# Patient Record
Sex: MALE | Race: WHITE | NOT HISPANIC OR LATINO | URBAN - NONMETROPOLITAN AREA
[De-identification: names, ages, dates, MRNs, and addresses within clinical notes are randomized per-mention and may not be internally consistent; named-entity substitution may affect disease eponyms.]

---

## 2021-01-20 DIAGNOSIS — Z23 ENCOUNTER FOR IMMUNIZATION: ICD-10-CM

## 2021-02-03 ENCOUNTER — IMMUNIZATIONS (OUTPATIENT)
Dept: FAMILY MEDICINE CLINIC | Facility: HOSPITAL | Age: 76
End: 2021-02-03
Attending: INTERNAL MEDICINE

## 2021-02-03 DIAGNOSIS — Z23 ENCOUNTER FOR IMMUNIZATION: Primary | ICD-10-CM

## 2021-02-03 PROCEDURE — 91301 SARS-COV-2 / COVID-19 MRNA VACCINE (MODERNA) 100 MCG: CPT

## 2021-02-03 PROCEDURE — 0011A SARS-COV-2 / COVID-19 MRNA VACCINE (MODERNA) 100 MCG: CPT

## 2021-03-02 ENCOUNTER — IMMUNIZATIONS (OUTPATIENT)
Dept: FAMILY MEDICINE CLINIC | Facility: HOSPITAL | Age: 76
End: 2021-03-02

## 2021-03-02 DIAGNOSIS — Z23 ENCOUNTER FOR IMMUNIZATION: Primary | ICD-10-CM

## 2021-03-02 PROCEDURE — 0012A SARS-COV-2 / COVID-19 MRNA VACCINE (MODERNA) 100 MCG: CPT

## 2021-03-02 PROCEDURE — 91301 SARS-COV-2 / COVID-19 MRNA VACCINE (MODERNA) 100 MCG: CPT

## 2021-11-09 ENCOUNTER — IMMUNIZATIONS (OUTPATIENT)
Dept: FAMILY MEDICINE CLINIC | Facility: HOSPITAL | Age: 76
End: 2021-11-09

## 2021-11-09 DIAGNOSIS — Z23 ENCOUNTER FOR IMMUNIZATION: Primary | ICD-10-CM

## 2021-11-09 PROCEDURE — 91306 COVID-19 MODERNA VACC 0.25 ML BOOSTER: CPT

## 2021-11-09 PROCEDURE — 0013A COVID-19 MODERNA VACC 0.25 ML BOOSTER: CPT

## 2023-04-27 DIAGNOSIS — E78.5 HYPERLIPIDEMIA, UNSPECIFIED: ICD-10-CM

## 2023-04-27 DIAGNOSIS — E66.09 OTHER OBESITY DUE TO EXCESS CALORIES: ICD-10-CM

## 2023-04-27 DIAGNOSIS — R06.09 OTHER FORMS OF DYSPNEA: ICD-10-CM

## 2023-04-27 DIAGNOSIS — I10 ESSENTIAL (PRIMARY) HYPERTENSION: ICD-10-CM

## 2023-04-27 DIAGNOSIS — I49.3 VENTRICULAR PREMATURE DEPOLARIZATION: ICD-10-CM

## 2023-05-30 ENCOUNTER — HOSPITAL ENCOUNTER (OUTPATIENT)
Dept: NUCLEAR MEDICINE | Facility: HOSPITAL | Age: 78
Discharge: HOME | End: 2023-05-30
Attending: INTERNAL MEDICINE

## 2023-05-30 ENCOUNTER — HOSPITAL ENCOUNTER (OUTPATIENT)
Dept: NON INVASIVE DIAGNOSTICS | Facility: HOSPITAL | Age: 78
Discharge: HOME | End: 2023-05-30
Attending: INTERNAL MEDICINE

## 2023-05-30 VITALS
WEIGHT: 215 LBS | HEIGHT: 71 IN | SYSTOLIC BLOOD PRESSURE: 158 MMHG | BODY MASS INDEX: 30.1 KG/M2 | DIASTOLIC BLOOD PRESSURE: 82 MMHG | HEART RATE: 95 BPM

## 2023-05-30 DIAGNOSIS — I10 ESSENTIAL (PRIMARY) HYPERTENSION: ICD-10-CM

## 2023-05-30 DIAGNOSIS — R06.09 OTHER FORMS OF DYSPNEA: ICD-10-CM

## 2023-05-30 DIAGNOSIS — I49.3 VENTRICULAR PREMATURE DEPOLARIZATION: ICD-10-CM

## 2023-05-30 DIAGNOSIS — E78.5 HYPERLIPIDEMIA, UNSPECIFIED: ICD-10-CM

## 2023-05-30 DIAGNOSIS — E66.09 OTHER OBESITY DUE TO EXCESS CALORIES: ICD-10-CM

## 2023-05-30 LAB
AORTIC ROOT: 3.7 CM
ASCENDING AORTA: 4.1 CM
CHEST PAIN STATEMENT: NORMAL
E WAVE DECELERATION TIME: 245 MS
FRACTIONAL SHORTENING: 36 % (ref 28–44)
INTERVENTRICULAR SEPTUM IN DIASTOLE (PARASTERNAL SHORT AXIS VIEW): 1 CM
INTERVENTRICULAR SEPTUM: 1 CM (ref 0.6–1.1)
LAAS-AP2: 23.4 CM2
LAAS-AP4: 24.1 CM2
LEFT ATRIUM SIZE: 4.4 CM
LEFT INTERNAL DIMENSION IN SYSTOLE: 3 CM (ref 2.1–4)
LEFT VENTRICULAR INTERNAL DIMENSION IN DIASTOLE: 4.7 CM (ref 3.5–6)
LEFT VENTRICULAR POSTERIOR WALL IN END DIASTOLE: 1 CM
LEFT VENTRICULAR STROKE VOLUME: 66 ML
LVSV (TEICH): 66 ML
MAX DIASTOLIC BP: 95 MMHG
MAX HEART RATE: 94 BPM
MAX HR: 94 BPM
MAX PREDICTED HEART RATE: 143 BPM
MAX. SYSTOLIC BP: 175 MMHG
MV E'TISSUE VEL-LAT: 6 CM/S
MV E'TISSUE VEL-SEP: 7 CM/S
MV PEAK A VEL: 0.94 M/S
MV PEAK E VEL: 67 CM/S
MV STENOSIS PRESSURE HALF TIME: 71 MS
MV VALVE AREA P 1/2 METHOD: 3.1 CM2
NUC STRESS EJECTION FRACTION: 62 %
PROTOCOL NAME: NORMAL
RA PRESSURE ESTIMATED: 15 MMHG
RATE PRESSURE PRODUCT: NORMAL
REASON FOR TERMINATION: NORMAL
RIGHT ATRIUM AREA SYSTOLE A4C: 19.2 CM2
RIGHT VENTRICLE ID DIMENSION: 2.9 CM
SL CV LEFT ATRIUM LENGTH A2C: 5.2 CM
SL CV PED ECHO LEFT VENTRICLE DIASTOLIC VOLUME (MOD BIPLANE) 2D: 100 ML
SL CV PED ECHO LEFT VENTRICLE SYSTOLIC VOLUME (MOD BIPLANE) 2D: 35 ML
SL CV REST NUCLEAR ISOTOPE DOSE: 10.9 MCI
SL CV STRESS NUCLEAR ISOTOPE DOSE: 32.4 MCI
SL CV STRESS RECOVERY BP: NORMAL MMHG
SL CV STRESS RECOVERY HR: 87 BPM
SL CV STRESS RECOVERY O2 SAT: 99 %
STRESS ANGINA INDEX: 0
STRESS BASELINE BP: NORMAL MMHG
STRESS BASELINE HR: 71 BPM
STRESS O2 SAT REST: 98 %
STRESS PEAK HR: 94 BPM
STRESS POST EXERCISE DUR MIN: 3 MIN
STRESS POST EXERCISE DUR SEC: 0 SEC
STRESS POST O2 SAT PEAK: 99 %
STRESS POST PEAK BP: 163 MMHG
STRESS/REST PERFUSION RATIO: 1.17
TARGET HR FORMULA: NORMAL
TEST INDICATION: NORMAL
TIME IN EXERCISE PHASE: NORMAL
TRICUSPID ANNULAR PLANE SYSTOLIC EXCURSION: 2.4 CM
TRICUSPID VALVE PEAK E WAVE VELOCITY: 0.12 M/S

## 2023-05-30 RX ORDER — ATORVASTATIN CALCIUM 40 MG/1
TABLET, FILM COATED ORAL
COMMUNITY
Start: 2023-04-27

## 2023-05-30 RX ORDER — LISINOPRIL 20 MG/1
TABLET ORAL
COMMUNITY
Start: 2023-03-13

## 2023-05-30 RX ORDER — LEVOTHYROXINE SODIUM 0.05 MG/1
TABLET ORAL
COMMUNITY
Start: 2023-05-22

## 2023-05-30 RX ORDER — REGADENOSON 0.08 MG/ML
0.4 INJECTION, SOLUTION INTRAVENOUS ONCE
Status: COMPLETED | OUTPATIENT
Start: 2023-05-30 | End: 2023-05-30

## 2023-05-30 RX ORDER — ZOLPIDEM TARTRATE 10 MG/1
TABLET ORAL
COMMUNITY
Start: 2023-05-10

## 2023-05-30 RX ORDER — FUROSEMIDE 20 MG/1
TABLET ORAL
COMMUNITY
Start: 2023-05-22

## 2023-05-30 RX ADMIN — REGADENOSON 0.4 MG: 0.08 INJECTION, SOLUTION INTRAVENOUS at 10:15

## 2025-05-07 ENCOUNTER — EVALUATION (OUTPATIENT)
Dept: PHYSICAL THERAPY | Facility: CLINIC | Age: 80
End: 2025-05-07
Payer: COMMERCIAL

## 2025-05-07 VITALS — HEART RATE: 71 BPM | DIASTOLIC BLOOD PRESSURE: 64 MMHG | SYSTOLIC BLOOD PRESSURE: 131 MMHG

## 2025-05-07 DIAGNOSIS — I89.0 LYMPHEDEMA: ICD-10-CM

## 2025-05-07 DIAGNOSIS — M79.89 LEG SWELLING: Primary | ICD-10-CM

## 2025-05-07 PROCEDURE — 97140 MANUAL THERAPY 1/> REGIONS: CPT | Performed by: PHYSICAL THERAPIST

## 2025-05-07 PROCEDURE — 97162 PT EVAL MOD COMPLEX 30 MIN: CPT | Performed by: PHYSICAL THERAPIST

## 2025-05-07 PROCEDURE — 97110 THERAPEUTIC EXERCISES: CPT | Performed by: PHYSICAL THERAPIST

## 2025-05-07 NOTE — PROGRESS NOTES
"PT Evaluation     Today's date: 2025  Patient name: Bartolo Orellana  : 1945  MRN: [id number]  Referring provider: Jero Obrien MD  Dx:   Encounter Diagnosis     ICD-10-CM    1. Leg swelling  M79.89       2. Lymphedema  I89.0           Start Time: 0845  Stop Time: 0945  Total time in clinic (min): 60 minutes  Lymphedema Physical Assessment    SUBJECTIVE EVALUATION:  History of present illness: swelling in Les x   years  tx with diuretic as per pt which he states did not change swelling. Notes gradual increase in swelling , stiffness in distal LE R > L . Tried elevation which did not help and had increase in dose of diuretic which he notes did not change swelling. Referred to cardiology and with review of chart , noted \"LE edema - still suspect lymphedema and not HF\" as per Dr. Hill. Pt using medication and compression stockings but notes no help with these changes and now referred to tx for LE swelling which he feels is worsening.  Pt notes some hx of swelling in Les of his brother  and niece but no know dx of lymphedema. 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  Exercises in swim spa consistently. Pt notes swelling is not gone in am in either LE.   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    OBJECTIVE:    Pulse: (0 no, 1+ diminished, 2+ mildly diminished, 3+ normal, 4 bounding)    Dorsal Pedal: 2+ L, unable R secondary to swelling  Posterior Tibial:2+    Capillary Refill (=< 3 seconds is normal): 2 seconds B    Palpation: dec skin mobility, pain with palpation of shin, ankles and dorsal feet where swelling present    Pittin+ distal 2/3 rds and dorsal foot RLE, 1-2+ L distal 1/4 and dorsal foot L LE    Stemmer's Sign: positive B    Gait " assessment:Independent     Transfer status: independent    Ability to don/doff clothing/garments:  independent    LE ROM:   Knee flexion limitation: wfls B   Ankle DF/PF limitation: R 5-30 , L 8 - 40  reports tightness and stiffness each LE with end ranges arthur ant ankle  and foot with end ranges of pf    Strength:    LE: WFLs at hips, knees and ankles    Self management:  Home compression pump:none  Compression stockings:none medical from [employer], did not wear today, states do not help  Elevation : completed some not above heart consistently, does not sleep in dependent position  Lymphedema exercise:none    Skin Assessment:  Skin mobility: decreased through distal 2/3 rds and dorsal foot RLE,  L distal 1/4 and dorsal foot L LE  Fibrosis (0 normal - 4 >severe): 1 right lower leg and dorsal foot  Erythema scale (1 very faint, 2 faint, 3 bright, 4 very bright):none either LE  Hemosiderin staining present:very minimal R shin, varicosities visible LLE lower leg, some present on R   Blister present: none   Location:   Color:   Size:  Wound present:none   Location:   Color:   Size:  NAILS - fair   Visualization:  Bony prominences: yes L ankle, not foot, none R ankle or foot  Tendon pathways:none either   Joint creases: none    Lymphedema classification (0 latent, 1 reverse, 2 non-rev, 3 elephantiasis): 2 R, 1 L     PMHx:  Cellulitis:none   Most recent:  Cardiac:HTN  Renal:na  Hepatic:na  Cancer:na   Type:   Treatment:  Diabetes:na  Ortho:na  Surgical:na       LLIS 24% impairment today    >> REFER TO FLOW SHEET FOR GIRTH MEASUREMENTS <<  LOWER EXTREMITY LEFT CALCULATIONS      Flowsheet Row Most Recent Value   Volume LE (mL) 4810   Difference from last visit (mL)  -4810   Difference from first visit (mL)  -4810          LOWER EXTREMITY RIGHT CALCULATIONS      Flowsheet Row Most Recent Value   Volume LE (mL) 5217   Difference from last visit (mL)  -5217   Difference from first visit (mL)  -5217          LOWER EXTREMITY CHANGE  OF AFFECTED LIMB CALCULATIONS      Flowsheet Row Most Recent Value   Current change in volume of affected limb (mL)  0   Current change in volume of affected limb (%)  8   Change in volume of affected limb since eval (mL)  0   Change in volume of affected limb since eval (%)  0              ASSESSMENT:  Assessment details:  Pt presents w/ B LE edema w/ + stemmer's, mild pitting, and slight skin fibrosis, predominant involvement of B lower legs distal 2/3rds into feet, not toes.  R>L girth measurements are noted which fits visual inspection of R>L LE size. PMHx not significant for DVT, trauma  and cardiologist identified lymphedema prior. Advise initiating full CDT protocol w/ MLD and compression bandage alternative wraps R and stocking 20-30 mmHG LLE. Fitment of compression stockings RLE once limb size is reduced.     PT reviewed with patient lymphedema education of skin care including: elevation 3 x per day 30-60 minutes higher than heart, keeping extremity clean and dry, applying moisturizer daily, special attention to nail care, wearing sunscreen avoiding nicks and skin irritation from razors, wearing gloves with activities that may cause skin injury.   Also reviewed for patient to avoid excessive constriction such as tight clothing and jewelry, extreme temperature changes, and the importance of compliance for bandaging and garments.   Patient also advised to contact physician if experiencing any constitutional symptoms, swelling, redness, pain, rash, itching, or increased skin temperature.      Goals:STG  - Patient will understand lymphedema precautions to decrease risk of infection and exacerbation of lymphedema  - Patient will develop a tolerance for wearing bandage alternative wrap R , stocking L betweens sessions to facilitate limb decongestion  - Patient will experience decrease in pitting edema which will improve tissue health and decrease risk of infection.   - Patient will perform HEP independently or  with minimal assistance to help improve lymphatic flow and venous return    LTG  - Patient will experience increased ROM and functional mobility to aid with ADL's at time of discharge  - Patient and/or caregiver will be independent with donning and doffing of compression garments which will enable regular daily garment wear at time of discharge, skin maintenance, and self- MLD   - Patient and/or caregiver will be independent with HEP and lymphedema management to prevent relapse and reduce risk of infection and hospitalizations at time of discharge    LTG:    Lymphedema Life Impact Scale    % Impairment: 24%  Infection Occurrence: none         PLAN:  Patient would benefit from: skilled physical therapy  Planned therapy interventions: manual therapy, massage, compression, home exercise program and patient education  Frequency: 1-2 x oer week  Duration in visits: up to 24  Duration in weeks: 12  Plan of Care beginning date: 5/7/25  Plan of Care expiration date: 7/16/25  Treatment plan discussed with: patient      Precautions: HTN     Daily Treatment Diary:      Initial Evaluation Date: 5/7/2025  Compliance 5/7                     Visit Number 1                    Re-Eval  IE                 MC   LLIS % impairment 24%                           5/7                     Manual                      Circumf measures pc                     mld                                            Ther-Ex                      AROM ex fr Ly return pc                     D . Breathing pc                                                                                                                                                                               HEP, pt educ tx plan, elevation 3 x per day higher than heart 30-60 min, skin/nail care, signs or sx to contact physician or ED if occur PC                     Neuro Re-Ed                                                                                                Ther-Act                                                                Modalities

## 2025-05-07 NOTE — LETTER
"May 9, 2025    No Recipients    Patient: Bartolo Orellana   YOB: 1945   Date of Visit: 2025     Encounter Diagnosis     ICD-10-CM    1. Leg swelling  M79.89       2. Lymphedema  I89.0           Dear Dr. Jero Obrien MD:    Thank you for your recent referral of Bartolo Orellana. Please review the attached evaluation summary from Bartolo's recent visit.     Please verify that you agree with the plan of care by signing the attached order.     If you have any questions or concerns, please do not hesitate to call.     I sincerely appreciate the opportunity to share in the care of one of your patients and hope to have another opportunity to work with you in the near future.       Sincerely,    Sherry Mckeon, PT      Referring Provider:      I certify that I have read the below Plan of Care and certify the need for these services furnished under this plan of treatment while under my care.                    Jero Obrien MD  [address] [place] [address]  Via Fax: [phone]          PT Evaluation     Today's date: 2025  Patient name: Bartolo Orellana  : 1945  MRN: [id number]  Referring provider: Jero Obrien MD  Dx:   Encounter Diagnosis     ICD-10-CM    1. Leg swelling  M79.89       2. Lymphedema  I89.0           Start Time: 0845  Stop Time: 0945  Total time in clinic (min): 60 minutes  Lymphedema Physical Assessment    SUBJECTIVE EVALUATION:  History of present illness: swelling in Les x   years  tx with diuretic as per pt which he states did not change swelling. Notes gradual increase in swelling , stiffness in distal LE R > L . Tried elevation which did not help and had increase in dose of diuretic which he notes did not change swelling. Referred to cardiology and with review of chart , noted \"LE edema - still suspect lymphedema and not HF\" as per Dr. Hill. Pt using medication and compression stockings but notes no help with these changes and now " referred to tx for LE swelling which he feels is worsening.  Pt notes some hx of swelling in Les of his brother  and niece but no know dx of lymphedema. 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  Exercises in swim spa consistently. Pt notes swelling is not gone in am in either LE.   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    OBJECTIVE:    Pulse: (0 no, 1+ diminished, 2+ mildly diminished, 3+ normal, 4 bounding)    Dorsal Pedal: 2+ L, unable R secondary to swelling  Posterior Tibial:2+    Capillary Refill (=< 3 seconds is normal): 2 seconds B    Palpation: dec skin mobility, pain with palpation of shin, ankles and dorsal feet where swelling present    Pittin+ distal 2/3 rds and dorsal foot RLE, 1-2+ L distal 1/4 and dorsal foot L LE    Stemmer's Sign: positive B    Gait assessment:Independent     Transfer status: independent    Ability to don/doff clothing/garments:  independent    LE ROM:   Knee flexion limitation: wfls B   Ankle DF/PF limitation: R 5-30 , L 8 - 40  reports tightness and stiffness each LE with end ranges arthur ant ankle  and foot with end ranges of pf    Strength:    LE: WFLs at hips, knees and ankles    Self management:  Home compression pump:none  Compression stockings:none medical from [employer], did not wear today, states do not help  Elevation : completed some not above heart consistently, does not sleep in dependent position  Lymphedema exercise:none    Skin Assessment:  Skin mobility: decreased through distal 2/3 rds and dorsal foot RLE,  L distal 1/4 and dorsal foot L LE  Fibrosis (0 normal - 4 >severe): 1 right lower leg and dorsal foot  Erythema scale (1 very faint, 2 faint, 3 bright, 4 very bright):none either LE  Hemosiderin staining  present:very minimal R shin, varicosities visible LLE lower leg, some present on R   Blister present: none   Location:   Color:   Size:  Wound present:none   Location:   Color:   Size:  NAILS - fair   Visualization:  Bony prominences: yes L ankle, not foot, none R ankle or foot  Tendon pathways:none either   Joint creases: none    Lymphedema classification (0 latent, 1 reverse, 2 non-rev, 3 elephantiasis): 2 R, 1 L     PMHx:  Cellulitis:none   Most recent:  Cardiac:HTN  Renal:na  Hepatic:na  Cancer:na   Type:   Treatment:  Diabetes:na  Ortho:na  Surgical:na       LLIS 24% impairment today    >> REFER TO FLOW SHEET FOR GIRTH MEASUREMENTS <<  LOWER EXTREMITY LEFT CALCULATIONS      Flowsheet Row Most Recent Value   Volume LE (mL) 4810   Difference from last visit (mL)  -4810   Difference from first visit (mL)  -4810          LOWER EXTREMITY RIGHT CALCULATIONS      Flowsheet Row Most Recent Value   Volume LE (mL) 5217   Difference from last visit (mL)  -5217   Difference from first visit (mL)  -5217          LOWER EXTREMITY CHANGE OF AFFECTED LIMB CALCULATIONS      Flowsheet Row Most Recent Value   Current change in volume of affected limb (mL)  0   Current change in volume of affected limb (%)  8   Change in volume of affected limb since eval (mL)  0   Change in volume of affected limb since eval (%)  0              ASSESSMENT:  Assessment details:  Pt presents w/ B LE edema w/ + stemmer's, mild pitting, and slight skin fibrosis, predominant involvement of B lower legs distal 2/3rds into feet, not toes.  R>L girth measurements are noted which fits visual inspection of R>L LE size. PMHx not significant for DVT, trauma  and cardiologist identified lymphedema prior. Advise initiating full CDT protocol w/ MLD and compression bandage alternative wraps R and stocking 20-30 mmHG LLE. Fitment of compression stockings RLE once limb size is reduced.     PT reviewed with patient lymphedema education of skin care including:  elevation 3 x per day 30-60 minutes higher than heart, keeping extremity clean and dry, applying moisturizer daily, special attention to nail care, wearing sunscreen avoiding nicks and skin irritation from razors, wearing gloves with activities that may cause skin injury.   Also reviewed for patient to avoid excessive constriction such as tight clothing and jewelry, extreme temperature changes, and the importance of compliance for bandaging and garments.   Patient also advised to contact physician if experiencing any constitutional symptoms, swelling, redness, pain, rash, itching, or increased skin temperature.      Goals:STG  - Patient will understand lymphedema precautions to decrease risk of infection and exacerbation of lymphedema  - Patient will develop a tolerance for wearing bandage alternative wrap R , stocking L betweens sessions to facilitate limb decongestion  - Patient will experience decrease in pitting edema which will improve tissue health and decrease risk of infection.   - Patient will perform HEP independently or with minimal assistance to help improve lymphatic flow and venous return    LTG  - Patient will experience increased ROM and functional mobility to aid with ADL's at time of discharge  - Patient and/or caregiver will be independent with donning and doffing of compression garments which will enable regular daily garment wear at time of discharge, skin maintenance, and self- MLD   - Patient and/or caregiver will be independent with HEP and lymphedema management to prevent relapse and reduce risk of infection and hospitalizations at time of discharge    LTG:    Lymphedema Life Impact Scale    % Impairment: 24%  Infection Occurrence: none         PLAN:  Patient would benefit from: skilled physical therapy  Planned therapy interventions: manual therapy, massage, compression, home exercise program and patient education  Frequency: 1-2 x oer week  Duration in visits: up to 24  Duration in weeks:  12  Plan of Care beginning date: 5/7/25  Plan of Care expiration date: 7/16/25  Treatment plan discussed with: patient      Precautions: HTN     Daily Treatment Diary:      Initial Evaluation Date: 5/7/2025  Compliance 5/7                     Visit Number 1                    Re-Eval  IE                 MC   LLIS % impairment 24%                           5/7                     Manual                      Circumf measures pc                     mld                                            Ther-Ex                      AROM ex fr Ly return pc                     D . Breathing pc                                                                                                                                                                               HEP, pt educ tx plan, elevation 3 x per day higher than heart 30-60 min, skin/nail care, signs or sx to contact physician or ED if occur PC                     Neuro Re-Ed                                                                                                Ther-Act                                                               Modalities

## 2025-05-08 NOTE — HOME EXERCISE EDUCATION
Program_ID:[id number]   Access Code: [email or web address]  URL: [email or web address]  Date: 05-  Prepared By: Sherry Mckeon    Program Notes      Exercises      - Reclined Diaphragmatic Breathing - 2 x daily - 7 x weekly - 1 sets - 20 reps      - Seated Toe Curl - 2 x daily - 7 x weekly - 1 sets - 20 reps      - Supine Ankle Dorsiflexion and Plantarflexion AROM - 2 x daily - 7 x weekly - 1 sets - 20 reps      - Supine Ankle Circles - 2 x daily - 7 x weekly - 1 sets - 20 reps      - Supine Heel Slide - 2 x daily - 7 x weekly - 1 sets - 20 reps      - Supine Quad Set - 2 x daily - 7 x weekly - 1 sets - 20 reps - 3 hold      - Small Range Straight Leg Raise - 2 x daily - 7 x weekly - 1 sets - 20 reps - 2 hold      - Bent Knee Fallouts - 2 x daily - 7 x weekly - 1 sets - 20 reps - 5 hold      - Supine Gluteal Sets - 2 x daily - 7 x weekly - 1 sets - 20 reps - 2 hold      - Lower Trunk Rotations - 2 x daily - 7 x weekly - 1 sets - 20 reps - 5 hold    Patient Education      - Leg Elevation for Swelling

## 2025-05-21 ENCOUNTER — OFFICE VISIT (OUTPATIENT)
Dept: PHYSICAL THERAPY | Facility: CLINIC | Age: 80
End: 2025-05-21
Attending: FAMILY MEDICINE
Payer: COMMERCIAL

## 2025-05-21 DIAGNOSIS — I89.0 LYMPHEDEMA: ICD-10-CM

## 2025-05-21 DIAGNOSIS — M79.89 LEG SWELLING: Primary | ICD-10-CM

## 2025-05-21 PROCEDURE — 97140 MANUAL THERAPY 1/> REGIONS: CPT | Performed by: PHYSICAL THERAPIST

## 2025-05-21 NOTE — PROGRESS NOTES
Daily Note     Today's date: 2025  Patient name: Bartolo Orellana  : 1945  MRN: [id number]  Referring provider: Jero Obrien MD  Dx:   Encounter Diagnosis     ICD-10-CM    1. Leg swelling  M79.89       2. Lymphedema  I89.0                      Subjective: Pt notes he is not seeing any improvements with  compliance with elevation, HEP since IE.       Objective: See treatment diary below. Initiated proximal MLD tolerated well by pt during and after. Presents with pitted swelling R dorsal foot, ankle and lower leg.       Assessment: Tolerated treatment well. Will likely benefit from ready wrap foot and calf for RLE, compression stocking for LLE and I will reach out to Dr. Obrien requesting  order. Pt approved submission to [facility] for insurance check. Patient would benefit from continued PT      Plan: Continue per plan of care.      Precautions: HTN     Daily Treatment Diary:      Initial Evaluation Date: 2025  Compliance                    Visit Number 1 2                   Re-Eval  IE                 MC   LLIS % impairment 24%                                              Manual                      Circumf measures pc  garment measures pc                   mld   prox abd, RLE pc                                         Ther-Ex                      AROM ex fr Ly return pc                     D . Breathing pc                                                                                                                                                                               HEP, pt educ tx plan, elevation 3 x per day higher than heart 30-60 min, skin/nail care, signs or sx to contact physician or ED if occur PC  pc review                   Neuro Re-Ed                                                                                                Ther-Act                                                               Modalities

## 2025-05-28 ENCOUNTER — OFFICE VISIT (OUTPATIENT)
Dept: PHYSICAL THERAPY | Facility: CLINIC | Age: 80
End: 2025-05-28
Attending: FAMILY MEDICINE
Payer: COMMERCIAL

## 2025-05-28 DIAGNOSIS — M79.89 LEG SWELLING: Primary | ICD-10-CM

## 2025-05-28 DIAGNOSIS — I89.0 LYMPHEDEMA: ICD-10-CM

## 2025-05-28 PROCEDURE — 97140 MANUAL THERAPY 1/> REGIONS: CPT | Performed by: PHYSICAL THERAPIST

## 2025-05-28 NOTE — PROGRESS NOTES
Daily Note     Today's date: 2025  Patient name: Bartolo Orellana  : 1945  MRN: [id number]  Referring provider: Jero Obrien MD  Dx:   Encounter Diagnosis     ICD-10-CM    1. Leg swelling  M79.89       2. Lymphedema  I89.0                      Subjective: Pt notes wearing compression socks with zippers most day with some improvements. He notes compliance with HEP and elevation.  He notes tenderness to touch and stiffness with mobility.       Objective: See treatment diary below. Poor fitment of compression stockings through calves educated pt in proper garment features and benefits. Called to pcp to request order for ready wrap for RLE and foot and compression stocking for LLE this date.       Assessment: Tolerated treatment well. Patient would benefit from continued PT and compression garments medical grade to assist with stage II Lymphedema RLE especially. Anticipate ready wrap to further reduce swelling in RLE/foot and L to do well in compression stocking.       Plan: Continue per plan of care.      Precautions: HTN     Daily Treatment Diary:      Initial Evaluation Date: 2025  Compliance                  Visit Number 1 2  3                 Re-Eval  IE                 MC   LLIS % impairment 24%                                            Manual                      Circumf measures pc  garment measures pc  remeasure as reduction                 mld   prox abd, RLE pc  45 m                                       Ther-Ex                      AROM ex fr Ly return pc                     D . Breathing pc                                                                                                                                                                               HEP, pt educ tx plan, elevation 3 x per day higher than heart 30-60 min, skin/nail care, signs or sx to contact physician or ED if occur PC  pc review                   Neuro Re-Ed                                                                                                 Ther-Act                                                               Modalities

## 2025-05-30 ENCOUNTER — OFFICE VISIT (OUTPATIENT)
Dept: PHYSICAL THERAPY | Facility: CLINIC | Age: 80
End: 2025-05-30
Attending: FAMILY MEDICINE
Payer: COMMERCIAL

## 2025-05-30 DIAGNOSIS — M79.89 LEG SWELLING: Primary | ICD-10-CM

## 2025-05-30 DIAGNOSIS — I89.0 LYMPHEDEMA: ICD-10-CM

## 2025-05-30 PROCEDURE — 97140 MANUAL THERAPY 1/> REGIONS: CPT | Performed by: PHYSICAL THERAPIST

## 2025-05-30 NOTE — PROGRESS NOTES
Daily Note     Today's date: 2025  Patient name: Bartolo Orellana  : 1945  MRN: [id number]  Referring provider: Jero Obrien MD  Dx:   Encounter Diagnosis     ICD-10-CM    1. Leg swelling  M79.89       2. Lymphedema  I89.0                      Subjective:  Pt notes no changes in swelling  despite elevation, HEP and use of current compression stockings.       Objective: See treatment diary below. Pt presents with pitting swelling dorsal right foot.       Assessment: Tolerated treatment well. Patient would benefit from continued PT.       Plan: Continue per plan of care.      Precautions: HTN     Daily Treatment Diary:      Initial Evaluation Date: 2025  Compliance                Visit Number 1 2  3  4               Re-Eval  IE                 MC   LLIS % impairment 24%                                          Manual                      Circumf measures pc  garment measures pc  remeasure as reduction                 mld   prox abd, RLE pc  45 m  45m                                     Ther-Ex                      AROM ex fr Ly return pc                     D . Breathing pc                                                                                                                                                                               HEP, pt educ tx plan, elevation 3 x per day higher than heart 30-60 min, skin/nail care, signs or sx to contact physician or ED if occur PC  pc review                   Neuro Re-Ed                                                                                                Ther-Act                                                               Modalities

## 2025-06-04 ENCOUNTER — OFFICE VISIT (OUTPATIENT)
Dept: PHYSICAL THERAPY | Facility: CLINIC | Age: 80
End: 2025-06-04
Attending: FAMILY MEDICINE
Payer: COMMERCIAL

## 2025-06-04 DIAGNOSIS — I89.0 LYMPHEDEMA: ICD-10-CM

## 2025-06-04 DIAGNOSIS — M79.89 LEG SWELLING: Primary | ICD-10-CM

## 2025-06-04 PROCEDURE — 97140 MANUAL THERAPY 1/> REGIONS: CPT | Performed by: PHYSICAL THERAPIST

## 2025-06-04 NOTE — PROGRESS NOTES
Daily Note     Today's date: 2025  Patient name: Bartolo Orellana  : 1945  MRN: [id number]  Referring provider: Jero Obrien MD  Dx:   Encounter Diagnosis     ICD-10-CM    1. Leg swelling  M79.89       2. Lymphedema  I89.0                      Subjective: Pt  noting swelling about the same with compliance with HEP, elevation 3 x per day x 30 min and use of current compression socks.       Objective: See treatment diary below. Slight reduction visible in R dorsal foot and ankle but pitting persists B 2+ R, 1+ L dorsal foot. No swelling in toes.       Assessment: Tolerated treatment well. Patient would benefit from continued PT, compression stockings and lymphedema sequential pumps to assist with reduction and management of stage 2 lymphedema. Pt is not finding significant improvements in sx of tightness, heaviness and difficulty with garments currently.       Plan: Continue per plan of care.      Precautions: HTN     Daily Treatment Diary:      Initial Evaluation Date: 2025  Compliance   reeval           Visit Number 1 2  3  4  5             Re-Eval  IE                    LLIS % impairment 24%                                        Manual                      Circumf measures pc  garment measures pc  remeasure as reduction    brief remeasure garments             mld   prox abd, RLE pc  45 m  45m  42m                                   Ther-Ex                      AROM ex fr Ly return pc                     D . Breathing pc                                                                                                                                                                               HEP, pt educ tx plan, elevation 3 x per day higher than heart 30-60 min, skin/nail care, signs or sx to contact physician or ED if occur PC  pc review                   Neuro Re-Ed                                                                                                 Ther-Act                                                               Modalities

## 2025-06-06 ENCOUNTER — OFFICE VISIT (OUTPATIENT)
Dept: PHYSICAL THERAPY | Facility: CLINIC | Age: 80
End: 2025-06-06
Attending: FAMILY MEDICINE
Payer: COMMERCIAL

## 2025-06-06 DIAGNOSIS — I89.0 LYMPHEDEMA: ICD-10-CM

## 2025-06-06 DIAGNOSIS — M79.89 LEG SWELLING: Primary | ICD-10-CM

## 2025-06-06 PROCEDURE — 97140 MANUAL THERAPY 1/> REGIONS: CPT | Performed by: PHYSICAL THERAPIST

## 2025-06-06 NOTE — PROGRESS NOTES
Daily Note     Today's date: 2025  Patient name: Bartolo Orellana  : 1945  MRN: [id number]  Referring provider: Jero Obrien MD  Dx:   Encounter Diagnosis     ICD-10-CM    1. Leg swelling  M79.89       2. Lymphedema  I89.0                      Subjective: Pt notes legs are the same with completion of elevation  3 x per day, HEP and use of his current stockings.       Objective: See treatment diary below. Pt demonstrates pitted swelling dorsal R greater than L foot, non pitted swelling in ankles and lower legs, greater on R than L. No open areas, no rashes, no blisters or wounds present either LE or foot.      Assessment: Tolerated treatment well. Patient would benefit from continued PT and  ordered compression garments.      Plan: Continue per plan of care.      Precautions: HTN     Daily Treatment Diary:      Initial Evaluation Date: 2025  Compliance            Visit Number 1 2  3  4  5  6           Re-Eval  IE                 MC   LLIS % impairment 24%                                      Manual                      Circumf measures pc  garment measures pc  remeasure as reduction    brief remeasure garments             mld   prox abd, RLE pc  45 m  45m  42m  45m                                 Ther-Ex                      AROM ex fr Ly return pc                     D . Breathing pc                                                                                                                                                                               HEP, pt educ tx plan, elevation 3 x per day higher than heart 30-60 min, skin/nail care, signs or sx to contact physician or ED if occur PC  pc review                   Neuro Re-Ed                                                                                                Ther-Act                                                               Modalities

## 2025-06-12 ENCOUNTER — APPOINTMENT (OUTPATIENT)
Dept: PHYSICAL THERAPY | Facility: CLINIC | Age: 80
End: 2025-06-12
Attending: FAMILY MEDICINE
Payer: COMMERCIAL

## 2025-06-18 ENCOUNTER — EVALUATION (OUTPATIENT)
Dept: PHYSICAL THERAPY | Facility: CLINIC | Age: 80
End: 2025-06-18
Attending: FAMILY MEDICINE
Payer: COMMERCIAL

## 2025-06-18 DIAGNOSIS — I89.0 LYMPHEDEMA: ICD-10-CM

## 2025-06-18 DIAGNOSIS — M79.89 LEG SWELLING: Primary | ICD-10-CM

## 2025-06-18 PROCEDURE — 97140 MANUAL THERAPY 1/> REGIONS: CPT | Performed by: PHYSICAL THERAPIST

## 2025-06-18 NOTE — LETTER
"2025    Jero Obrien MD  [address] [facility] [address]    Patient: Bartolo Orellana   YOB: 1945   Date of Visit: 2025     Encounter Diagnosis     ICD-10-CM    1. Leg swelling  M79.89       2. Lymphedema  I89.0           Dear Dr. Jero Obrien MD:    Thank you for your recent referral of Bartolo Orellana. Please review the attached evaluation summary from Bartolo's recent visit.     Please verify that you agree with the plan of care by signing the attached order.     If you have any questions or concerns, please do not hesitate to call.     I sincerely appreciate the opportunity to share in the care of one of your patients and hope to have another opportunity to work with you in the near future.       Sincerely,    Sherry Mckeon, PT      Referring Provider:      I certify that I have read the below Plan of Care and certify the need for these services furnished under this plan of treatment while under my care.                    Jero Obrien MD  [address] [facility] [address]  Via Fax: [phone]          PT Re-Evaluation     Today's date: 2025  Patient name: Bartolo Orellana  : 1945  MRN: [id number]  Referring provider: Jero Obrien MD  Dx:   Encounter Diagnosis     ICD-10-CM    1. Leg swelling  M79.89       2. Lymphedema  I89.0             Start Time: 0800  Stop Time: 0845  Total time in clinic (min): 45 minutes  Lymphedema Physical Assessment    SUBJECTIVE EVALUATION:  History of present illness: swelling in Les x   years  tx with diuretic as per pt which he states did not change swelling. Notes gradual increase in swelling , stiffness in distal LE R > L . Tried elevation which did not help and had increase in dose of diuretic which he notes did not change swelling. Referred to cardiology and with review of chart , noted \"LE edema - still suspect lymphedema and not HF\" as per Dr. Hill. Pt using medication and " compression stockings but notes no help with these changes and now referred to tx for LE swelling which he feels is worsening.  Pt notes some hx of swelling in Les of his brother  and niece but no know dx of lymphedema. 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  Exercises in swim spa consistently. Pt notes swelling is not gone in am in either LE.    Completion 2x per day and use of some compression- waiting on order from DME for correctly measured garments.   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    OBJECTIVE:    Pulse: (0 no, 1+ diminished, 2+ mildly diminished, 3+ normal, 4 bounding)    Dorsal Pedal: 2+ L, unable R secondary to swelling  Posterior Tibial:2+    Capillary Refill (=< 3 seconds is normal): 2 seconds B    Palpation: dec skin mobility, pain with palpation of shin, ankles and dorsal feet where swelling present    Pittin+ R ankle and dorsal lateral and distal foot RLE, 1 L ankles and dorsal foot L LE    Stemmer's Sign: positive B    Gait assessment:Independent     Transfer status: independent    Ability to don/doff clothing/garments:  independent    LE ROM:   Knee flexion limitation: wfls B   Ankle DF/PF limitation: R 10-35 , L 10 - 40  reports tightness and stiffness each LE with end ranges arthur ant ankle  and foot with end ranges of pf    Strength:    LE: WFLs at hips, knees and ankles    Self management:  Home compression pump:none order has been placed with [facility]  Compression stockings:wearing non  medical from [employer] until order arrives-has been shipped as per [facility]  Elevation : completing 2-3 x per day higher than heart   Lymphedema exercise: HEP 2 x per day    Skin Assessment:  Skin mobility: decreased through distal 2/3 rds and dorsal  foot RLE,  L distal 1/4 and dorsal foot L LE  Fibrosis (0 normal - 4 >severe): 1 right lower leg and dorsal foot  Erythema scale (1 very faint, 2 faint, 3 bright, 4 very bright):none either LE  Hemosiderin staining present:very minimal R shin, varicosities visible LLE lower leg, some present on R   Blister present: none   Location:   Color:   Size:  Wound present:none   Location:   Color:   Size:  NAILS - fair   Visualization:  Bony prominences: yes L ankle, not foot, none R ankle or foot  Tendon pathways:none either   Joint creases: none    Lymphedema classification (0 latent, 1 reverse, 2 non-rev, 3 elephantiasis): 2 R, 1 L     PMHx:  Cellulitis:none   Most recent:  Cardiac:HTN  Renal:na  Hepatic:na  Cancer:na   Type:   Treatment:  Diabetes:na  Ortho:na  Surgical:na       LLIS 16% impairment today 6/18/2025    >> REFER TO FLOW SHEET FOR GIRTH MEASUREMENTS <<  LOWER EXTREMITY LEFT CALCULATIONS      Flowsheet Row Most Recent Value   Volume LE (mL) 4671   Difference from last visit (mL)  139   Difference from first visit (mL)  139   Difference from last visit (%)  3   Difference from first visit (%)  3          LOWER EXTREMITY RIGHT CALCULATIONS      Flowsheet Row Most Recent Value   Volume LE (mL) 4967   Difference from last visit (mL)  250   Difference from first visit (mL)  250   Difference from last visit (%)  5   Difference from first visit (%)  5                ASSESSMENT:  Assessment details:  Pt presents w/ B LE edema w/ + stemmer's, mild pitting, and slight skin fibrosis, predominant involvement of B lower legs distal 2/3rds into feet, not toes.  R>L girth measurements are noted which fits visual inspection of R>L LE size. PMHx not significant for DVT, trauma  and cardiologist identified lymphedema prior. Advise initiating full CDT protocol w/ MLD and compression bandage alternative wraps R and stocking 20-30 mmHG LLE. Fitment of compression stockings RLE once limb size is reduced.     PT reviewed with  patient lymphedema education of skin care including: elevation 3 x per day 30-60 minutes higher than heart, keeping extremity clean and dry, applying moisturizer daily, special attention to nail care, wearing sunscreen avoiding nicks and skin irritation from razors, wearing gloves with activities that may cause skin injury.   Also reviewed for patient to avoid excessive constriction such as tight clothing and jewelry, extreme temperature changes, and the importance of compliance for bandaging and garments.   Patient also advised to contact physician if experiencing any constitutional symptoms, swelling, redness, pain, rash, itching, or increased skin temperature.     Update 6/18/2025- Pt cont to receive MLD tx and and garments/pumps have not arrived to date but have been ordered. Pt has demonstrated some response to care today as indicated by volume reduction of 3-5% as noted above. Pt cont to have swelling, heaviness and stiffness, decrease in tissue mobility, pitting edema, positive stemmers signs despite compliance with self managements. Pt is anticipated to cont to progress with care plan of MLD and addition of compression and sequential pumps. This is imperative to address current swelling stage 1-2 Ly and prevent likelihood of progression of lymphedema staging and impacting ADLs, tissue health.      Goals:STG   - Patient will understand lymphedema precautions to decrease risk of infection and exacerbation of lymphedema achieved  - Patient will develop a tolerance for wearing bandage alternative wrap R , stocking L betweens sessions to facilitate limb decongestion-has not yet received garments  - Patient will experience decrease in pitting edema which will improve tissue health and decrease risk of infection.  Some progression  - Patient will perform HEP independently or with minimal assistance to help improve lymphatic flow and venous return achieved    LTG progressing   - Patient will experience increased ROM  and functional mobility to aid with ADL's at time of discharge  - Patient and/or caregiver will be independent with donning and doffing of compression garments which will enable regular daily garment wear at time of discharge, skin maintenance, and self- MLD   - Patient and/or caregiver will be independent with HEP and lymphedema management to prevent relapse and reduce risk of infection and hospitalizations at time of discharge      Lymphedema Life Impact Scale    % Impairment: 24% 6/18/25- 16%  Infection Occurrence: none         PLAN:  Patient would benefit from: skilled physical therapy  Planned therapy interventions: manual therapy, massage, compression, home exercise program and patient education  Frequency: 1-2 x oer week  Duration in visits: up to 24  Duration in weeks: 6  Plan of Care beginning date: 6/18/25  Plan of Care expiration date: 8/01/25  Treatment plan discussed with: patient    Plan: Continue per plan of care.      Precautions: HTN     Daily Treatment Diary:      Initial Evaluation Date: 5/7/2025  Compliance 5/7 5/21 5/28 5/30 6/4 6/6 6/18         Visit Number 1 2  3  4  5  6  7         Re-Eval  IE           y      MC   LLIS % impairment 24%           y 16%                5/7 5/21 5/28 5/30  6/4 6/6 6/18         Manual                      Circumf measures pc  garment measures pc  remeasure as reduction    brief remeasure garments    pc         mld   prox abd, RLE pc  45 m  45m  42m  45m  30m RLE t!                               Ther-Ex                      AROM ex fr Ly return pc                     D . Breathing pc                                                                                                                                                                               HEP, pt educ tx plan, elevation 3 x per day higher than heart 30-60 min, skin/nail care, signs or sx to contact physician or ED if occur PC  pc review                   Neuro Re-Ed                                                                                                 Ther-Act                                                               Modalities

## 2025-06-18 NOTE — PROGRESS NOTES
"PT Re-Evaluation     Today's date: 2025  Patient name: Bartolo Orellana  : 1945  MRN: [id number]  Referring provider: Jero Obrien MD  Dx:   Encounter Diagnosis     ICD-10-CM    1. Leg swelling  M79.89       2. Lymphedema  I89.0             Start Time: 0800  Stop Time: 0845  Total time in clinic (min): 45 minutes  Lymphedema Physical Assessment    SUBJECTIVE EVALUATION:  History of present illness: swelling in Les x   years  tx with diuretic as per pt which he states did not change swelling. Notes gradual increase in swelling , stiffness in distal LE R > L . Tried elevation which did not help and had increase in dose of diuretic which he notes did not change swelling. Referred to cardiology and with review of chart , noted \"LE edema - still suspect lymphedema and not HF\" as per Dr. Hill. Pt using medication and compression stockings but notes no help with these changes and now referred to tx for LE swelling which he feels is worsening.  Pt notes some hx of swelling in Les of his brother  and niece but no know dx of lymphedema. 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  Exercises in swim spa consistently. Pt notes swelling is not gone in am in either LE.    Completion 2x per day and use of some compression- waiting on order from DME for correctly measured garments.   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    OBJECTIVE:    Pulse: (0 no, 1+ diminished, 2+ mildly diminished, 3+ normal, 4 bounding)    Dorsal Pedal: 2+ L, unable R secondary to swelling  Posterior Tibial:2+    Capillary Refill (=< 3 seconds is normal): 2 seconds B    Palpation: dec skin mobility, pain with palpation of shin, ankles and dorsal feet where swelling " present    Pittin+ R ankle and dorsal lateral and distal foot RLE, 1 L ankles and dorsal foot L LE    Stemmer's Sign: positive B    Gait assessment:Independent     Transfer status: independent    Ability to don/doff clothing/garments:  independent    LE ROM:   Knee flexion limitation: wfls B   Ankle DF/PF limitation: R 10-35 , L 10 - 40  reports tightness and stiffness each LE with end ranges arthur ant ankle  and foot with end ranges of pf    Strength:    LE: WFLs at hips, knees and ankles    Self management:  Home compression pump:none order has been placed with [facility]  Compression stockings:wearing non  medical from [employer] until order arrives-has been shipped as per dme  Elevation : completing 2-3 x per day higher than heart   Lymphedema exercise: HEP 2 x per day    Skin Assessment:  Skin mobility: decreased through distal 2/3 rds and dorsal foot RLE,  L distal 1/4 and dorsal foot L LE  Fibrosis (0 normal - 4 >severe): 1 right lower leg and dorsal foot  Erythema scale (1 very faint, 2 faint, 3 bright, 4 very bright):none either LE  Hemosiderin staining present:very minimal R shin, varicosities visible LLE lower leg, some present on R   Blister present: none   Location:   Color:   Size:  Wound present:none   Location:   Color:   Size:  NAILS - fair   Visualization:  Bony prominences: yes L ankle, not foot, none R ankle or foot  Tendon pathways:none either   Joint creases: none    Lymphedema classification (0 latent, 1 reverse, 2 non-rev, 3 elephantiasis): 2 R, 1 L     PMHx:  Cellulitis:none   Most recent:  Cardiac:HTN  Renal:na  Hepatic:na  Cancer:na   Type:   Treatment:  Diabetes:na  Ortho:na  Surgical:na       LLIS 16% impairment today 2025    >> REFER TO FLOW SHEET FOR GIRTH MEASUREMENTS <<  LOWER EXTREMITY LEFT CALCULATIONS      Flowsheet Row Most Recent Value   Volume LE (mL) 4671   Difference from last visit (mL)  139   Difference from first visit (mL)  139   Difference from last visit (%)  3    Difference from first visit (%)  3          LOWER EXTREMITY RIGHT CALCULATIONS      Flowsheet Row Most Recent Value   Volume LE (mL) 4967   Difference from last visit (mL)  250   Difference from first visit (mL)  250   Difference from last visit (%)  5   Difference from first visit (%)  5                ASSESSMENT:  Assessment details:  Pt presents w/ B LE edema w/ + stemmer's, mild pitting, and slight skin fibrosis, predominant involvement of B lower legs distal 2/3rds into feet, not toes.  R>L girth measurements are noted which fits visual inspection of R>L LE size. PMHx not significant for DVT, trauma  and cardiologist identified lymphedema prior. Advise initiating full CDT protocol w/ MLD and compression bandage alternative wraps R and stocking 20-30 mmHG LLE. Fitment of compression stockings RLE once limb size is reduced.     PT reviewed with patient lymphedema education of skin care including: elevation 3 x per day 30-60 minutes higher than heart, keeping extremity clean and dry, applying moisturizer daily, special attention to nail care, wearing sunscreen avoiding nicks and skin irritation from razors, wearing gloves with activities that may cause skin injury.   Also reviewed for patient to avoid excessive constriction such as tight clothing and jewelry, extreme temperature changes, and the importance of compliance for bandaging and garments.   Patient also advised to contact physician if experiencing any constitutional symptoms, swelling, redness, pain, rash, itching, or increased skin temperature.     Update 6/18/2025- Pt cont to receive MLD tx and and garments/pumps have not arrived to date but have been ordered. Pt has demonstrated some response to care today as indicated by volume reduction of 3-5% as noted above. Pt cont to have swelling, heaviness and stiffness, decrease in tissue mobility, pitting edema, positive stemmers signs despite compliance with self managements. Pt is anticipated to cont to  progress with care plan of MLD and addition of compression and sequential pumps. This is imperative to address current swelling stage 1-2 Ly and prevent likelihood of progression of lymphedema staging and impacting ADLs, tissue health.      Goals:STG   - Patient will understand lymphedema precautions to decrease risk of infection and exacerbation of lymphedema achieved  - Patient will develop a tolerance for wearing bandage alternative wrap R , stocking L betweens sessions to facilitate limb decongestion-has not yet received garments  - Patient will experience decrease in pitting edema which will improve tissue health and decrease risk of infection.  Some progression  - Patient will perform HEP independently or with minimal assistance to help improve lymphatic flow and venous return achieved    LTG progressing   - Patient will experience increased ROM and functional mobility to aid with ADL's at time of discharge  - Patient and/or caregiver will be independent with donning and doffing of compression garments which will enable regular daily garment wear at time of discharge, skin maintenance, and self- MLD   - Patient and/or caregiver will be independent with HEP and lymphedema management to prevent relapse and reduce risk of infection and hospitalizations at time of discharge      Lymphedema Life Impact Scale    % Impairment: 24% 6/18/25- 16%  Infection Occurrence: none         PLAN:  Patient would benefit from: skilled physical therapy  Planned therapy interventions: manual therapy, massage, compression, home exercise program and patient education  Frequency: 1-2 x oer week  Duration in visits: up to 24  Duration in weeks: 6  Plan of Care beginning date: 6/18/25  Plan of Care expiration date: 8/01/25  Treatment plan discussed with: patient    Plan: Continue per plan of care.      Precautions: HTN     Daily Treatment Diary:      Initial Evaluation Date: 5/7/2025  Compliance 5/7 5/21 5/28 5/30 6/4 6/6 6/18          Visit Number 1 2  3  4  5  6  7         Re-Eval  IE           y      MC   LLIS % impairment 24%           y 16%                5/7 5/21 5/28 5/30 6/4 6/6 6/18         Manual                      Circumf measures pc  garment measures pc  remeasure as reduction    brief remeasure garments    pc         mld   prox abd, RLE pc  45 m  45m  42m  45m  30m RLE t!                               Ther-Ex                      AROM ex fr Ly return pc                     D . Breathing pc                                                                                                                                                                               HEP, pt educ tx plan, elevation 3 x per day higher than heart 30-60 min, skin/nail care, signs or sx to contact physician or ED if occur PC  pc review                   Neuro Re-Ed                                                                                                Ther-Act                                                               Modalities

## 2025-06-20 ENCOUNTER — OFFICE VISIT (OUTPATIENT)
Dept: PHYSICAL THERAPY | Facility: CLINIC | Age: 80
End: 2025-06-20
Attending: FAMILY MEDICINE
Payer: COMMERCIAL

## 2025-06-20 DIAGNOSIS — M79.89 LEG SWELLING: Primary | ICD-10-CM

## 2025-06-20 DIAGNOSIS — I89.0 LYMPHEDEMA: ICD-10-CM

## 2025-06-20 PROCEDURE — 97530 THERAPEUTIC ACTIVITIES: CPT | Performed by: PHYSICAL THERAPIST

## 2025-06-20 NOTE — PROGRESS NOTES
Daily Note     Today's date: 2025  Patient name: Bartolo Orellana  : 1945  MRN: [id number]  Referring provider: Jero Obrien MD  Dx:   Encounter Diagnosis     ICD-10-CM    1. Leg swelling  M79.89       2. Lymphedema  I89.0                      Subjective: Pt notes swelling about the same in each LE.       Objective: See treatment diary below. Spent time educating pt in garments for RLE foot and leg- ready wraps. Trained patient in donning, doffing, and care of compression garments today. Patient demonstrates good learning and at end of session is independent in donning safely and effectively. Compression plan to be daytime use but does require size mod R foot and did order today with pt.  Patient will follow up Monday.      Assessment: Tolerated treatment well. Patient demonstrated good technique with mods and repeat educ and practice.       Plan: Continue per plan of care.    Precautions: HTN     Daily Treatment Diary:      Initial Evaluation Date: 2025  Compliance        Visit Number 1 2  3  4  5  6  7  8       Re-Eval  IE           y      MC   LLIS % impairment 24%           y 16%                       Manual                      Circumf measures pc  garment measures pc  remeasure as reduction    brief remeasure garments    pc         mld   prox abd, RLE pc  45 m  45m  42m  45m  30m RLE t!                               Ther-Ex                      AROM ex fr Ly return pc                     D . Breathing pc                                                                                                                                                                               HEP, pt educ tx plan, elevation 3 x per day higher than heart 30-60 min, skin/nail care, signs or sx to contact physician or ED if occur PC  pc review                   Neuro Re-Ed                                                                                                 Ther-Act                             garments pc                                  Modalities

## 2025-06-23 ENCOUNTER — OFFICE VISIT (OUTPATIENT)
Dept: PHYSICAL THERAPY | Facility: CLINIC | Age: 80
End: 2025-06-23
Attending: FAMILY MEDICINE
Payer: COMMERCIAL

## 2025-06-23 DIAGNOSIS — I89.0 LYMPHEDEMA: ICD-10-CM

## 2025-06-23 DIAGNOSIS — M79.89 LEG SWELLING: Primary | ICD-10-CM

## 2025-06-23 PROCEDURE — 97530 THERAPEUTIC ACTIVITIES: CPT | Performed by: PHYSICAL THERAPIST

## 2025-06-23 PROCEDURE — 97140 MANUAL THERAPY 1/> REGIONS: CPT | Performed by: PHYSICAL THERAPIST

## 2025-06-23 NOTE — PROGRESS NOTES
Daily Note     Today's date: 2025  Patient name: Bartolo Orellana  : 1945  MRN: [id number]  Referring provider: Jero Obrien MD  Dx:   Encounter Diagnosis     ICD-10-CM    1. Leg swelling  M79.89       2. Lymphedema  I89.0                      Subjective: No change in swelling. Cont to be complaint with elevation HEP and compression but swelling persists.       Objective: See treatment diary below. Pt educated in garment of Juzo below knee closed toe compression stocking for L LE. He was able to don and doff and return verbalization of care, wear schedule, goal of tx and anticipated role of garment with results of use. Pt recommended podiatry for nail care secondary to inability/difficulty caring  for his toe nails.       Assessment: Tolerated treatment well. Patient would benefit from continued PT , skilled care and compression garments and pump use.       Plan: Continue per plan of care.      Precautions: HTN     Daily Treatment Diary:      Initial Evaluation Date: 2025  Compliance      Visit Number 1 2  3  4  5  6  7  8  9     Re-Eval  IE           y      MC   LLIS % impairment 24%           y 16%                     Manual                      Circumf measures pc  garment measures pc  remeasure as reduction    brief remeasure garments    pc         mld   prox abd, RLE pc  45 m  45m  42m  45m  30m RLE t!    30m pc                            Ther-Ex                      AROM ex fr Ly return pc                15m garment training pc below knee stockings     D . Breathing pc                                                                                                                                                                               HEP, pt educ tx plan, elevation 3 x per day higher than heart 30-60 min, skin/nail care, signs or sx to contact physician or ED if occur PC  pc review                    Neuro Re-Ed                                                                                                Ther-Act                             jonna ramirez                                  Modalities

## 2025-06-24 ENCOUNTER — OFFICE VISIT (OUTPATIENT)
Dept: PODIATRY | Age: 80
End: 2025-06-24
Payer: COMMERCIAL

## 2025-06-24 VITALS — HEIGHT: 71 IN | BODY MASS INDEX: 30.24 KG/M2 | WEIGHT: 216 LBS

## 2025-06-24 DIAGNOSIS — B35.1 ONYCHOMYCOSIS: ICD-10-CM

## 2025-06-24 DIAGNOSIS — I73.9 PVD (PERIPHERAL VASCULAR DISEASE) (HCC): Primary | ICD-10-CM

## 2025-06-24 PROCEDURE — 11721 DEBRIDE NAIL 6 OR MORE: CPT | Performed by: STUDENT IN AN ORGANIZED HEALTH CARE EDUCATION/TRAINING PROGRAM

## 2025-06-24 PROCEDURE — 99203 OFFICE O/P NEW LOW 30 MIN: CPT | Performed by: STUDENT IN AN ORGANIZED HEALTH CARE EDUCATION/TRAINING PROGRAM

## 2025-06-24 RX ORDER — CITALOPRAM HYDROBROMIDE 20 MG/1
20 TABLET ORAL DAILY
COMMUNITY
Start: 2025-04-29

## 2025-06-24 RX ORDER — OLMESARTAN MEDOXOMIL 20 MG/1
20 TABLET ORAL DAILY
COMMUNITY
Start: 2025-06-17

## 2025-06-24 RX ORDER — TORSEMIDE 20 MG/1
20 TABLET ORAL DAILY
COMMUNITY
Start: 2025-05-22

## 2025-06-24 RX ORDER — LEVOTHYROXINE SODIUM 75 MCG
75 TABLET ORAL
COMMUNITY
Start: 2025-06-17

## 2025-06-24 RX ORDER — CITALOPRAM HYDROBROMIDE 10 MG/1
10 TABLET ORAL DAILY
COMMUNITY

## 2025-06-24 NOTE — PROGRESS NOTES
"Name: Bartolo Orellana      : 1945      MRN: [id number]  Encounter Provider: Juli Car DPM  Encounter Date: 2025   Encounter department: [facility] PODIATRY [place]  :  Assessment & Plan  PVD (peripheral vascular disease) ([facility])  Onychomycosis  - PCP note from 25 reviewed  - PT notes reviewed (leg swelling/lymphedema)  - Foot exam as below. Patient has significant lower extremity risk due to edema, temperature changes and trophic skin changes to the lower extremity.  - Debridement of toenails. Using nail nipper, lilliana, and curette, nails were sharply debrided, reduced in thickness and length. Devitalized nail tissue and fungal debris excised and removed. Patient tolerated well.    - Discussed proper shoe gear, daily inspections of feet, and general foot health with patient.   - Patient has Q9  findings and is recommended for at risk foot care every 9-10 weeks.           History of Present Illness   HPI  Bartolo Orellana is a 79 y.o. male who presents to clinic for nail care. Nails are long and thick and he cannot cut them.      Review of Systems   Constitutional:  Negative for activity change, chills and fever.   HENT: Negative.     Respiratory:  Negative for cough, chest tightness and shortness of breath.    Cardiovascular:  Positive for leg swelling. Negative for chest pain.   Endocrine: Negative.    Genitourinary: Negative.    Neurological: Negative.  Negative for numbness.   Psychiatric/Behavioral: Negative.  Negative for agitation and behavioral problems.           Objective   Ht 5' 11\" (1.803 m)   Wt 98 kg (216 lb)   BMI 30.13 kg/m²      Physical Exam  Constitutional:       General: He is not in acute distress.     Appearance: Normal appearance. He is obese. He is not ill-appearing.     Cardiovascular:      Comments: B/L DP pulses 1/4. B/L PT pulses 1/4. Legs to toes warm to cool. Diminished pedal hair. B/L LE moderate R>L edema with varicosities.   Pulmonary:      " Effort: Pulmonary effort is normal.     Musculoskeletal:         General: No tenderness. Normal range of motion.      Right lower leg: Edema present.     Skin:     General: Skin is warm.      Capillary Refill: Capillary refill takes less than 2 seconds.      Comments: Atrophic skin to LE - thin, dry and shiny.   No open wounds.  Elongated, thickened, yellowed toenails x10 with subungual debris.     Neurological:      General: No focal deficit present.      Mental Status: He is alert and oriented to person, place, and time.     Psychiatric:         Mood and Affect: Mood normal.

## 2025-07-02 ENCOUNTER — OFFICE VISIT (OUTPATIENT)
Dept: PHYSICAL THERAPY | Facility: CLINIC | Age: 80
End: 2025-07-02
Attending: FAMILY MEDICINE
Payer: COMMERCIAL

## 2025-07-02 DIAGNOSIS — M79.89 LEG SWELLING: Primary | ICD-10-CM

## 2025-07-02 DIAGNOSIS — I89.0 LYMPHEDEMA: ICD-10-CM

## 2025-07-02 PROCEDURE — 97140 MANUAL THERAPY 1/> REGIONS: CPT | Performed by: PHYSICAL THERAPIST

## 2025-07-02 PROCEDURE — 97530 THERAPEUTIC ACTIVITIES: CPT | Performed by: PHYSICAL THERAPIST

## 2025-07-02 NOTE — PROGRESS NOTES
Daily Note     Today's date: 2025  Patient name: Bartolo Orellana  : 1945  MRN: [id number]  Referring provider: Jero Obrien MD  Dx:   Encounter Diagnosis     ICD-10-CM    1. Leg swelling  M79.89       2. Lymphedema  I89.0                      Subjective: Pt notes he is wearing compression stockings daily and is unsure if noting any difference in swelling.  Pt notes he has seen podiatrist for nail care.       Objective: See treatment diary below. Pt care cont with skin check. Pitted swelling remains present on dorsal R foot greater than L with positive stemmer's sign.  His stocking is fitting well.  Pt did receive ready wrap for R foot and fitment completed today. Pt was able to demonstrate proper self application of each following instruction. Capillary refill good with garment placement. Pt had no complaints with application and was able to amb with heel toe gait and no deviation demonstrated. Pt educated to reach out if has any questions or concerns about garment but will be ok to cont  with self managements until next visit in 2 weeks but call sooner if questions or concerns and he verbalized agreement and understanding.       Assessment: Tolerated treatment well. Patient would benefit from continued PT      Plan: Continue per plan of care.    .  Precautions: HTN     Daily Treatment Diary:      Initial Evaluation Date: 2025  Compliance   7/2   Visit Number 1 2  3  4  5  6  7  8  9  10   Re-Eval  IE           y      -   LLIS % impairment 24%           y 16%      -            6  7/2   Manual                      Circumf measures pc  garment measures pc  remeasure as reduction    brief remeasure garments    pc      skin check pc B Les, garment check LLE pc   mld   prox abd, RLE pc  45 m  45m  42m  45m  30m RLE t!    30m pc   -                         Ther-Ex                      AROM ex fr Ly  return pc                15m garment training pc below knee stockings     D . Breathing pc                                                                                                                                                                               HEP, pt educ tx plan, elevation 3 x per day higher than heart 30-60 min, skin/nail care, signs or sx to contact physician or ED if occur PC  pc review                   Neuro Re-Ed                                                                                                Ther-Act                             garments pc    ready wrap foot and leg educ and pt application pc                              Modalities

## 2025-07-17 ENCOUNTER — OFFICE VISIT (OUTPATIENT)
Dept: PHYSICAL THERAPY | Facility: CLINIC | Age: 80
End: 2025-07-17
Attending: FAMILY MEDICINE
Payer: COMMERCIAL

## 2025-07-17 DIAGNOSIS — I89.0 LYMPHEDEMA: ICD-10-CM

## 2025-07-17 DIAGNOSIS — M79.89 LEG SWELLING: Primary | ICD-10-CM

## 2025-07-17 PROCEDURE — 97140 MANUAL THERAPY 1/> REGIONS: CPT | Performed by: PHYSICAL THERAPIST

## 2025-07-17 PROCEDURE — 97530 THERAPEUTIC ACTIVITIES: CPT | Performed by: PHYSICAL THERAPIST

## 2025-07-17 NOTE — PROGRESS NOTES
Daily Note     Today's date: 2025  Patient name: Bartolo Orellana  : 1945  MRN: [id number]  Referring provider: Jero Obrien MD  Dx:   Encounter Diagnosis     ICD-10-CM    1. Leg swelling  M79.89       2. Lymphedema  I89.0                      Subjective: Pt notes he did receive the Ly pumps with instruction in use and has utilized daily since with good tolerance.        Objective: See treatment diary below.Volumetric measures in RLE indicate increase since last measure 2025 with reduction in volume of LLE as compared to 25. Pt did not have foot CT ready wrap on properly- not tightened well at ankle and dorsal foot- too loose. Reviewed application of foot piece with pt completing for me and he demonstrated competence. Added grey foam to assist with edema reduction. Will recheck in 1.5 -2 weeks or sooner if pt does find swelling increases with donning adjustments completed.       Assessment: Tolerated treatment well. Patient will cont to benefit from skilled care to achieve goals outlined.       Plan: Continue per plan of care.      .  Precautions: HTN     Daily Treatment Diary:      Initial Evaluation Date: 2025  Compliance   6 6  7/2   Visit Number 11 2  3  4  5  6  7  8  9  10   Re-Eval  -           y      -   LLIS % impairment -           y 16%      -            6/  6  7/2   Manual                      Circumf measures pc  garment measures pc  remeasure as reduction    brief remeasure garments    pc      skin check pc B Les, garment check LLE pc   mld 20mRLE  prox abd, RLE pc  45 m  45m  42m  45m  30m RLE t!    30m pc   -                         Ther-Ex                      AROM ex fr Ly return                 15m garment training pc below knee stockings     D . Breathing pc                                                                                                                                                                                HEP, pt educ tx plan, elevation 3 x per day higher than heart 30-60 min, skin/nail care, signs or sx to contact physician or ED if occur PC  pc review                   Neuro Re-Ed                                                                                                Ther-Act               Ready wrap foot review and add grey foam dorsal foot pc              garments pc    ready wrap foot and leg educ and pt application pc                              Modalities

## 2025-07-28 ENCOUNTER — EVALUATION (OUTPATIENT)
Dept: PHYSICAL THERAPY | Facility: CLINIC | Age: 80
End: 2025-07-28
Attending: FAMILY MEDICINE
Payer: COMMERCIAL

## 2025-07-28 DIAGNOSIS — M79.89 LEG SWELLING: Primary | ICD-10-CM

## 2025-07-28 DIAGNOSIS — I89.0 LYMPHEDEMA: ICD-10-CM

## 2025-07-28 PROCEDURE — 97140 MANUAL THERAPY 1/> REGIONS: CPT | Performed by: PHYSICAL THERAPIST
